# Patient Record
Sex: FEMALE | Race: WHITE
[De-identification: names, ages, dates, MRNs, and addresses within clinical notes are randomized per-mention and may not be internally consistent; named-entity substitution may affect disease eponyms.]

---

## 2020-10-06 ENCOUNTER — HOSPITAL ENCOUNTER (INPATIENT)
Dept: HOSPITAL 8 - ED | Age: 60
LOS: 6 days | Discharge: HOME | DRG: 471 | End: 2020-10-12
Attending: INTERNAL MEDICINE | Admitting: HOSPITALIST
Payer: MEDICAID

## 2020-10-06 VITALS — DIASTOLIC BLOOD PRESSURE: 89 MMHG | SYSTOLIC BLOOD PRESSURE: 157 MMHG

## 2020-10-06 VITALS — SYSTOLIC BLOOD PRESSURE: 142 MMHG | DIASTOLIC BLOOD PRESSURE: 96 MMHG

## 2020-10-06 VITALS — WEIGHT: 141.98 LBS | HEIGHT: 63.5 IN | BODY MASS INDEX: 24.84 KG/M2

## 2020-10-06 DIAGNOSIS — M48.02: ICD-10-CM

## 2020-10-06 DIAGNOSIS — R26.2: ICD-10-CM

## 2020-10-06 DIAGNOSIS — F10.10: ICD-10-CM

## 2020-10-06 DIAGNOSIS — M47.812: ICD-10-CM

## 2020-10-06 DIAGNOSIS — Z80.1: ICD-10-CM

## 2020-10-06 DIAGNOSIS — M43.12: Primary | ICD-10-CM

## 2020-10-06 DIAGNOSIS — F17.210: ICD-10-CM

## 2020-10-06 DIAGNOSIS — Z20.828: ICD-10-CM

## 2020-10-06 DIAGNOSIS — R26.89: ICD-10-CM

## 2020-10-06 DIAGNOSIS — G95.19: ICD-10-CM

## 2020-10-06 DIAGNOSIS — I10: ICD-10-CM

## 2020-10-06 DIAGNOSIS — M47.12: ICD-10-CM

## 2020-10-06 DIAGNOSIS — Z90.49: ICD-10-CM

## 2020-10-06 LAB
ALBUMIN SERPL-MCNC: 3.8 G/DL (ref 3.4–5)
ALP SERPL-CCNC: 114 U/L (ref 45–117)
ALT SERPL-CCNC: 20 U/L (ref 12–78)
ANION GAP SERPL CALC-SCNC: 3 MMOL/L (ref 5–15)
APTT BLD: 24 SECONDS (ref 25–31)
BASOPHILS # BLD AUTO: 0.1 X10^3/UL (ref 0–0.1)
BASOPHILS NFR BLD AUTO: 2 % (ref 0–1)
BILIRUB SERPL-MCNC: 0.4 MG/DL (ref 0.2–1)
CALCIUM SERPL-MCNC: 8.7 MG/DL (ref 8.5–10.1)
CHLORIDE SERPL-SCNC: 114 MMOL/L (ref 98–107)
CREAT SERPL-MCNC: 0.63 MG/DL (ref 0.55–1.02)
EOSINOPHIL # BLD AUTO: 0.1 X10^3/UL (ref 0–0.4)
EOSINOPHIL NFR BLD AUTO: 2 % (ref 1–7)
ERYTHROCYTE [DISTWIDTH] IN BLOOD BY AUTOMATED COUNT: 12.8 % (ref 9.6–15.2)
INR PPP: 1 (ref 0.93–1.1)
LYMPHOCYTES # BLD AUTO: 1.9 X10^3/UL (ref 1–3.4)
LYMPHOCYTES NFR BLD AUTO: 34 % (ref 22–44)
MCH RBC QN AUTO: 32.8 PG (ref 27–34.8)
MCHC RBC AUTO-ENTMCNC: 33.4 G/DL (ref 32.4–35.8)
MD: NO
MICROSCOPIC: (no result)
MONOCYTES # BLD AUTO: 0.5 X10^3/UL (ref 0.2–0.8)
MONOCYTES NFR BLD AUTO: 8 % (ref 2–9)
NEUTROPHILS # BLD AUTO: 3.1 X10^3/UL (ref 1.8–6.8)
NEUTROPHILS NFR BLD AUTO: 54 % (ref 42–75)
PLATELET # BLD AUTO: 273 X10^3/UL (ref 130–400)
PMV BLD AUTO: 8.5 FL (ref 7.4–10.4)
PROT SERPL-MCNC: 7.2 G/DL (ref 6.4–8.2)
PROTHROMBIN TIME: 10.3 SECONDS (ref 9.6–11.5)
RBC # BLD AUTO: 4.31 X10^6/UL (ref 3.82–5.3)
TROPONIN I SERPL-MCNC: < 0.015 NG/ML (ref 0–0.04)

## 2020-10-06 PROCEDURE — 87635 SARS-COV-2 COVID-19 AMP PRB: CPT

## 2020-10-06 PROCEDURE — 81001 URINALYSIS AUTO W/SCOPE: CPT

## 2020-10-06 PROCEDURE — 85610 PROTHROMBIN TIME: CPT

## 2020-10-06 PROCEDURE — 72157 MRI CHEST SPINE W/O & W/DYE: CPT

## 2020-10-06 PROCEDURE — 85025 COMPLETE CBC W/AUTO DIFF WBC: CPT

## 2020-10-06 PROCEDURE — 90686 IIV4 VACC NO PRSV 0.5 ML IM: CPT

## 2020-10-06 PROCEDURE — C1713 ANCHOR/SCREW BN/BN,TIS/BN: HCPCS

## 2020-10-06 PROCEDURE — 71045 X-RAY EXAM CHEST 1 VIEW: CPT

## 2020-10-06 PROCEDURE — 99285 EMERGENCY DEPT VISIT HI MDM: CPT

## 2020-10-06 PROCEDURE — C1729 CATH, DRAINAGE: HCPCS

## 2020-10-06 PROCEDURE — 72158 MRI LUMBAR SPINE W/O & W/DYE: CPT

## 2020-10-06 PROCEDURE — 95938 SOMATOSENSORY TESTING: CPT

## 2020-10-06 PROCEDURE — C1763 CONN TISS, NON-HUMAN: HCPCS

## 2020-10-06 PROCEDURE — 95941 IONM REMOTE/>1 PT OR PER HR: CPT

## 2020-10-06 PROCEDURE — A9575 INJ GADOTERATE MEGLUMI 0.1ML: HCPCS

## 2020-10-06 PROCEDURE — 84484 ASSAY OF TROPONIN QUANT: CPT

## 2020-10-06 PROCEDURE — 83880 ASSAY OF NATRIURETIC PEPTIDE: CPT

## 2020-10-06 PROCEDURE — 76000 FLUOROSCOPY <1 HR PHYS/QHP: CPT

## 2020-10-06 PROCEDURE — 36415 COLL VENOUS BLD VENIPUNCTURE: CPT

## 2020-10-06 PROCEDURE — 70450 CT HEAD/BRAIN W/O DYE: CPT

## 2020-10-06 PROCEDURE — 96374 THER/PROPH/DIAG INJ IV PUSH: CPT

## 2020-10-06 PROCEDURE — 80053 COMPREHEN METABOLIC PANEL: CPT

## 2020-10-06 PROCEDURE — 72040 X-RAY EXAM NECK SPINE 2-3 VW: CPT

## 2020-10-06 PROCEDURE — 93005 ELECTROCARDIOGRAM TRACING: CPT

## 2020-10-06 PROCEDURE — 72156 MRI NECK SPINE W/O & W/DYE: CPT

## 2020-10-06 PROCEDURE — 85730 THROMBOPLASTIN TIME PARTIAL: CPT

## 2020-10-06 RX ADMIN — GABAPENTIN SCH MG: 300 CAPSULE ORAL at 19:36

## 2020-10-06 RX ADMIN — THIAMINE HYDROCHLORIDE SCH MLS/HR: 100 INJECTION, SOLUTION INTRAMUSCULAR; INTRAVENOUS at 14:35

## 2020-10-06 RX ADMIN — GABAPENTIN SCH MG: 300 CAPSULE ORAL at 15:57

## 2020-10-06 RX ADMIN — FAMOTIDINE SCH MG: 10 TABLET ORAL at 21:00

## 2020-10-06 RX ADMIN — SODIUM CHLORIDE, PRESERVATIVE FREE SCH ML: 5 INJECTION INTRAVENOUS at 21:00

## 2020-10-06 NOTE — NUR
ASSUMED CARE OF PATIENT IN ROOM 4.  PT C/O NUMBNESS AND TINGLING IN HER HANDS 
AND FEET X 1 YEAR AND TROUBLE WALKING DUE TO DIZZINESS/UNSTEADINESS.  PT ALSO 
STATES SHE HAS BEEN HAVING EPISODES OF NAUSEA/VOMITING WHEN SITTING ON THE 
TOILET AND STRAINING TO PUSH OUT A BM.  PT STATES SHE HASN'T HAD A PCP X 30 
YEARS.

## 2020-10-07 VITALS — SYSTOLIC BLOOD PRESSURE: 117 MMHG | DIASTOLIC BLOOD PRESSURE: 78 MMHG

## 2020-10-07 VITALS — SYSTOLIC BLOOD PRESSURE: 146 MMHG | DIASTOLIC BLOOD PRESSURE: 99 MMHG

## 2020-10-07 VITALS — DIASTOLIC BLOOD PRESSURE: 85 MMHG | SYSTOLIC BLOOD PRESSURE: 140 MMHG

## 2020-10-07 VITALS — SYSTOLIC BLOOD PRESSURE: 134 MMHG | DIASTOLIC BLOOD PRESSURE: 89 MMHG

## 2020-10-07 RX ADMIN — GABAPENTIN SCH MG: 400 CAPSULE ORAL at 21:25

## 2020-10-07 RX ADMIN — FAMOTIDINE SCH MG: 10 TABLET ORAL at 09:33

## 2020-10-07 RX ADMIN — GABAPENTIN SCH MG: 400 CAPSULE ORAL at 16:21

## 2020-10-07 RX ADMIN — THIAMINE HYDROCHLORIDE SCH MLS/HR: 100 INJECTION, SOLUTION INTRAMUSCULAR; INTRAVENOUS at 13:17

## 2020-10-07 RX ADMIN — SODIUM CHLORIDE, PRESERVATIVE FREE SCH ML: 5 INJECTION INTRAVENOUS at 09:00

## 2020-10-07 RX ADMIN — NICOTINE SCH PATCH: 21 PATCH, EXTENDED RELEASE TRANSDERMAL at 09:35

## 2020-10-07 RX ADMIN — SODIUM CHLORIDE, PRESERVATIVE FREE SCH ML: 5 INJECTION INTRAVENOUS at 21:25

## 2020-10-07 RX ADMIN — FAMOTIDINE SCH MG: 10 TABLET ORAL at 21:25

## 2020-10-07 RX ADMIN — GABAPENTIN SCH MG: 400 CAPSULE ORAL at 09:33

## 2020-10-08 VITALS — DIASTOLIC BLOOD PRESSURE: 98 MMHG | SYSTOLIC BLOOD PRESSURE: 153 MMHG

## 2020-10-08 VITALS — DIASTOLIC BLOOD PRESSURE: 92 MMHG | SYSTOLIC BLOOD PRESSURE: 151 MMHG

## 2020-10-08 VITALS — DIASTOLIC BLOOD PRESSURE: 93 MMHG | SYSTOLIC BLOOD PRESSURE: 152 MMHG

## 2020-10-08 VITALS — SYSTOLIC BLOOD PRESSURE: 154 MMHG | DIASTOLIC BLOOD PRESSURE: 94 MMHG

## 2020-10-08 RX ADMIN — GABAPENTIN SCH MG: 400 CAPSULE ORAL at 08:50

## 2020-10-08 RX ADMIN — THIAMINE HYDROCHLORIDE SCH MLS/HR: 100 INJECTION, SOLUTION INTRAMUSCULAR; INTRAVENOUS at 13:52

## 2020-10-08 RX ADMIN — LISINOPRIL SCH MG: 10 TABLET ORAL at 21:27

## 2020-10-08 RX ADMIN — NICOTINE SCH PATCH: 21 PATCH, EXTENDED RELEASE TRANSDERMAL at 08:50

## 2020-10-08 RX ADMIN — SODIUM CHLORIDE, PRESERVATIVE FREE SCH ML: 5 INJECTION INTRAVENOUS at 08:52

## 2020-10-08 RX ADMIN — SODIUM CHLORIDE, PRESERVATIVE FREE SCH ML: 5 INJECTION INTRAVENOUS at 21:27

## 2020-10-08 RX ADMIN — FAMOTIDINE SCH MG: 10 TABLET ORAL at 08:50

## 2020-10-08 RX ADMIN — FAMOTIDINE SCH MG: 10 TABLET ORAL at 21:27

## 2020-10-08 RX ADMIN — GABAPENTIN SCH MG: 400 CAPSULE ORAL at 15:54

## 2020-10-08 RX ADMIN — GABAPENTIN SCH MG: 400 CAPSULE ORAL at 21:27

## 2020-10-08 RX ADMIN — LISINOPRIL SCH MG: 10 TABLET ORAL at 08:50

## 2020-10-09 VITALS — SYSTOLIC BLOOD PRESSURE: 135 MMHG | DIASTOLIC BLOOD PRESSURE: 84 MMHG

## 2020-10-09 VITALS — DIASTOLIC BLOOD PRESSURE: 90 MMHG | SYSTOLIC BLOOD PRESSURE: 158 MMHG

## 2020-10-09 VITALS — DIASTOLIC BLOOD PRESSURE: 90 MMHG | SYSTOLIC BLOOD PRESSURE: 186 MMHG

## 2020-10-09 VITALS — DIASTOLIC BLOOD PRESSURE: 83 MMHG | SYSTOLIC BLOOD PRESSURE: 133 MMHG

## 2020-10-09 PROCEDURE — 00NW0ZZ RELEASE CERVICAL SPINAL CORD, OPEN APPROACH: ICD-10-PCS | Performed by: NEUROLOGICAL SURGERY

## 2020-10-09 PROCEDURE — 01N10ZZ RELEASE CERVICAL NERVE, OPEN APPROACH: ICD-10-PCS | Performed by: NEUROLOGICAL SURGERY

## 2020-10-09 PROCEDURE — 0RG2071 FUSION OF 2 OR MORE CERVICAL VERTEBRAL JOINTS WITH AUTOLOGOUS TISSUE SUBSTITUTE, POSTERIOR APPROACH, POSTERIOR COLUMN, OPEN APPROACH: ICD-10-PCS | Performed by: NEUROLOGICAL SURGERY

## 2020-10-09 RX ADMIN — NICOTINE SCH PATCH: 21 PATCH, EXTENDED RELEASE TRANSDERMAL at 08:33

## 2020-10-09 RX ADMIN — GABAPENTIN SCH MG: 400 CAPSULE ORAL at 08:32

## 2020-10-09 RX ADMIN — SODIUM CHLORIDE, PRESERVATIVE FREE SCH ML: 5 INJECTION INTRAVENOUS at 08:00

## 2020-10-09 RX ADMIN — FENTANYL CITRATE PRN MCG: 50 INJECTION INTRAMUSCULAR; INTRAVENOUS at 17:55

## 2020-10-09 RX ADMIN — FAMOTIDINE SCH MG: 10 TABLET ORAL at 09:00

## 2020-10-09 RX ADMIN — LABETALOL HYDROCHLORIDE PRN MG: 5 INJECTION INTRAVENOUS at 17:30

## 2020-10-09 RX ADMIN — SODIUM CHLORIDE, PRESERVATIVE FREE SCH ML: 5 INJECTION INTRAVENOUS at 21:36

## 2020-10-09 RX ADMIN — FENTANYL CITRATE PRN MCG: 50 INJECTION INTRAMUSCULAR; INTRAVENOUS at 17:30

## 2020-10-09 RX ADMIN — LISINOPRIL SCH MG: 10 TABLET ORAL at 07:20

## 2020-10-09 RX ADMIN — THIAMINE HYDROCHLORIDE SCH MLS/HR: 100 INJECTION, SOLUTION INTRAMUSCULAR; INTRAVENOUS at 21:36

## 2020-10-09 RX ADMIN — LISINOPRIL SCH MG: 10 TABLET ORAL at 21:34

## 2020-10-09 RX ADMIN — GABAPENTIN SCH MG: 400 CAPSULE ORAL at 16:00

## 2020-10-09 RX ADMIN — GABAPENTIN SCH MG: 400 CAPSULE ORAL at 21:33

## 2020-10-09 RX ADMIN — SODIUM CHLORIDE AND POTASSIUM CHLORIDE SCH MLS/HR: .9; .15 SOLUTION INTRAVENOUS at 21:36

## 2020-10-09 RX ADMIN — LABETALOL HYDROCHLORIDE PRN MG: 5 INJECTION INTRAVENOUS at 17:50

## 2020-10-10 VITALS — SYSTOLIC BLOOD PRESSURE: 113 MMHG | DIASTOLIC BLOOD PRESSURE: 74 MMHG

## 2020-10-10 VITALS — DIASTOLIC BLOOD PRESSURE: 75 MMHG | SYSTOLIC BLOOD PRESSURE: 116 MMHG

## 2020-10-10 VITALS — DIASTOLIC BLOOD PRESSURE: 71 MMHG | SYSTOLIC BLOOD PRESSURE: 109 MMHG

## 2020-10-10 VITALS — DIASTOLIC BLOOD PRESSURE: 89 MMHG | SYSTOLIC BLOOD PRESSURE: 135 MMHG

## 2020-10-10 VITALS — SYSTOLIC BLOOD PRESSURE: 114 MMHG | DIASTOLIC BLOOD PRESSURE: 77 MMHG

## 2020-10-10 RX ADMIN — METHOCARBAMOL SCH MG: 750 TABLET ORAL at 13:45

## 2020-10-10 RX ADMIN — LISINOPRIL SCH MG: 10 TABLET ORAL at 08:47

## 2020-10-10 RX ADMIN — HYDROCODONE BITARTRATE AND ACETAMINOPHEN PRN TAB: 5; 325 TABLET ORAL at 00:58

## 2020-10-10 RX ADMIN — METHOCARBAMOL SCH MLS/HR: 100 INJECTION INTRAMUSCULAR; INTRAVENOUS at 09:43

## 2020-10-10 RX ADMIN — GABAPENTIN SCH MG: 400 CAPSULE ORAL at 08:47

## 2020-10-10 RX ADMIN — METHOCARBAMOL SCH MG: 750 TABLET ORAL at 20:30

## 2020-10-10 RX ADMIN — METHOCARBAMOL SCH MLS/HR: 100 INJECTION INTRAMUSCULAR; INTRAVENOUS at 17:55

## 2020-10-10 RX ADMIN — HYDROCODONE BITARTRATE AND ACETAMINOPHEN PRN TAB: 5; 325 TABLET ORAL at 10:34

## 2020-10-10 RX ADMIN — HYDROCODONE BITARTRATE AND ACETAMINOPHEN PRN TAB: 5; 325 TABLET ORAL at 14:39

## 2020-10-10 RX ADMIN — GABAPENTIN SCH MG: 400 CAPSULE ORAL at 21:28

## 2020-10-10 RX ADMIN — SODIUM CHLORIDE, PRESERVATIVE FREE SCH ML: 5 INJECTION INTRAVENOUS at 21:28

## 2020-10-10 RX ADMIN — DOCUSATE SODIUM 50MG AND SENNOSIDES 8.6MG SCH TAB: 8.6; 5 TABLET, FILM COATED ORAL at 08:46

## 2020-10-10 RX ADMIN — HYDROCODONE BITARTRATE AND ACETAMINOPHEN PRN TAB: 5; 325 TABLET ORAL at 00:32

## 2020-10-10 RX ADMIN — HYDROCODONE BITARTRATE AND ACETAMINOPHEN PRN TAB: 5; 325 TABLET ORAL at 06:05

## 2020-10-10 RX ADMIN — HYDROCODONE BITARTRATE AND ACETAMINOPHEN PRN TAB: 5; 325 TABLET ORAL at 19:57

## 2020-10-10 RX ADMIN — GABAPENTIN SCH MG: 400 CAPSULE ORAL at 15:34

## 2020-10-10 RX ADMIN — SODIUM CHLORIDE AND POTASSIUM CHLORIDE SCH MLS/HR: .9; .15 SOLUTION INTRAVENOUS at 15:34

## 2020-10-10 RX ADMIN — NICOTINE SCH PATCH: 21 PATCH, EXTENDED RELEASE TRANSDERMAL at 08:46

## 2020-10-10 RX ADMIN — CEFAZOLIN SODIUM SCH MLS/HR: 1 SOLUTION INTRAVENOUS at 01:03

## 2020-10-10 RX ADMIN — FAMOTIDINE SCH MG: 20 TABLET, FILM COATED ORAL at 08:47

## 2020-10-10 RX ADMIN — SODIUM CHLORIDE, PRESERVATIVE FREE SCH ML: 5 INJECTION INTRAVENOUS at 08:50

## 2020-10-10 RX ADMIN — METHOCARBAMOL SCH MLS/HR: 100 INJECTION INTRAMUSCULAR; INTRAVENOUS at 02:09

## 2020-10-10 RX ADMIN — CEFAZOLIN SODIUM SCH MLS/HR: 1 SOLUTION INTRAVENOUS at 09:03

## 2020-10-10 RX ADMIN — SODIUM CHLORIDE AND POTASSIUM CHLORIDE SCH MLS/HR: .9; .15 SOLUTION INTRAVENOUS at 07:00

## 2020-10-10 RX ADMIN — LISINOPRIL SCH MG: 10 TABLET ORAL at 21:28

## 2020-10-10 RX ADMIN — THIAMINE HYDROCHLORIDE SCH MLS/HR: 100 INJECTION, SOLUTION INTRAMUSCULAR; INTRAVENOUS at 21:27

## 2020-10-10 RX ADMIN — FAMOTIDINE SCH MG: 20 TABLET, FILM COATED ORAL at 21:00

## 2020-10-10 RX ADMIN — METHOCARBAMOL SCH MG: 750 TABLET ORAL at 08:30

## 2020-10-11 VITALS — DIASTOLIC BLOOD PRESSURE: 96 MMHG | SYSTOLIC BLOOD PRESSURE: 161 MMHG

## 2020-10-11 VITALS — SYSTOLIC BLOOD PRESSURE: 157 MMHG | DIASTOLIC BLOOD PRESSURE: 95 MMHG

## 2020-10-11 VITALS — DIASTOLIC BLOOD PRESSURE: 81 MMHG | SYSTOLIC BLOOD PRESSURE: 122 MMHG

## 2020-10-11 VITALS — SYSTOLIC BLOOD PRESSURE: 131 MMHG | DIASTOLIC BLOOD PRESSURE: 80 MMHG

## 2020-10-11 RX ADMIN — FAMOTIDINE SCH MG: 20 TABLET, FILM COATED ORAL at 21:00

## 2020-10-11 RX ADMIN — SODIUM CHLORIDE AND POTASSIUM CHLORIDE SCH MLS/HR: .9; .15 SOLUTION INTRAVENOUS at 03:02

## 2020-10-11 RX ADMIN — SODIUM CHLORIDE AND POTASSIUM CHLORIDE SCH MLS/HR: .9; .15 SOLUTION INTRAVENOUS at 13:00

## 2020-10-11 RX ADMIN — SODIUM CHLORIDE, PRESERVATIVE FREE SCH ML: 5 INJECTION INTRAVENOUS at 08:38

## 2020-10-11 RX ADMIN — HYDROCODONE BITARTRATE AND ACETAMINOPHEN PRN TAB: 5; 325 TABLET ORAL at 07:17

## 2020-10-11 RX ADMIN — HYDROCODONE BITARTRATE AND ACETAMINOPHEN PRN TAB: 5; 325 TABLET ORAL at 23:52

## 2020-10-11 RX ADMIN — FAMOTIDINE SCH MG: 20 TABLET, FILM COATED ORAL at 08:35

## 2020-10-11 RX ADMIN — SODIUM CHLORIDE, PRESERVATIVE FREE SCH ML: 5 INJECTION INTRAVENOUS at 21:18

## 2020-10-11 RX ADMIN — THIAMINE HYDROCHLORIDE SCH MLS/HR: 100 INJECTION, SOLUTION INTRAMUSCULAR; INTRAVENOUS at 23:17

## 2020-10-11 RX ADMIN — GABAPENTIN SCH MG: 400 CAPSULE ORAL at 21:17

## 2020-10-11 RX ADMIN — METHOCARBAMOL SCH MG: 750 TABLET ORAL at 13:53

## 2020-10-11 RX ADMIN — METHOCARBAMOL SCH MG: 750 TABLET ORAL at 02:15

## 2020-10-11 RX ADMIN — DOCUSATE SODIUM 50MG AND SENNOSIDES 8.6MG SCH TAB: 8.6; 5 TABLET, FILM COATED ORAL at 08:37

## 2020-10-11 RX ADMIN — SODIUM CHLORIDE AND POTASSIUM CHLORIDE SCH MLS/HR: .9; .15 SOLUTION INTRAVENOUS at 23:17

## 2020-10-11 RX ADMIN — HYDROCODONE BITARTRATE AND ACETAMINOPHEN PRN TAB: 5; 325 TABLET ORAL at 15:15

## 2020-10-11 RX ADMIN — GABAPENTIN SCH MG: 400 CAPSULE ORAL at 08:35

## 2020-10-11 RX ADMIN — LISINOPRIL SCH MG: 10 TABLET ORAL at 21:17

## 2020-10-11 RX ADMIN — NICOTINE SCH PATCH: 21 PATCH, EXTENDED RELEASE TRANSDERMAL at 08:37

## 2020-10-11 RX ADMIN — METHOCARBAMOL SCH MLS/HR: 100 INJECTION INTRAMUSCULAR; INTRAVENOUS at 02:17

## 2020-10-11 RX ADMIN — METHOCARBAMOL SCH MLS/HR: 100 INJECTION INTRAMUSCULAR; INTRAVENOUS at 12:22

## 2020-10-11 RX ADMIN — METHOCARBAMOL SCH MLS/HR: 100 INJECTION INTRAMUSCULAR; INTRAVENOUS at 21:18

## 2020-10-11 RX ADMIN — METHOCARBAMOL SCH MG: 750 TABLET ORAL at 19:30

## 2020-10-11 RX ADMIN — METHOCARBAMOL SCH MG: 750 TABLET ORAL at 07:39

## 2020-10-11 RX ADMIN — LISINOPRIL SCH MG: 10 TABLET ORAL at 08:35

## 2020-10-11 RX ADMIN — GABAPENTIN SCH MG: 400 CAPSULE ORAL at 15:15

## 2020-10-11 RX ADMIN — HYDROCODONE BITARTRATE AND ACETAMINOPHEN PRN TAB: 5; 325 TABLET ORAL at 02:16

## 2020-10-11 RX ADMIN — HYDROCODONE BITARTRATE AND ACETAMINOPHEN PRN TAB: 5; 325 TABLET ORAL at 11:37

## 2020-10-11 RX ADMIN — HYDROCODONE BITARTRATE AND ACETAMINOPHEN PRN TAB: 5; 325 TABLET ORAL at 19:30

## 2020-10-12 VITALS — SYSTOLIC BLOOD PRESSURE: 133 MMHG | DIASTOLIC BLOOD PRESSURE: 87 MMHG

## 2020-10-12 VITALS — SYSTOLIC BLOOD PRESSURE: 165 MMHG | DIASTOLIC BLOOD PRESSURE: 94 MMHG

## 2020-10-12 VITALS — SYSTOLIC BLOOD PRESSURE: 163 MMHG | DIASTOLIC BLOOD PRESSURE: 91 MMHG

## 2020-10-12 VITALS — SYSTOLIC BLOOD PRESSURE: 137 MMHG | DIASTOLIC BLOOD PRESSURE: 92 MMHG

## 2020-10-12 RX ADMIN — METHOCARBAMOL SCH MG: 750 TABLET ORAL at 08:19

## 2020-10-12 RX ADMIN — GABAPENTIN SCH MG: 400 CAPSULE ORAL at 08:19

## 2020-10-12 RX ADMIN — DOCUSATE SODIUM 50MG AND SENNOSIDES 8.6MG SCH TAB: 8.6; 5 TABLET, FILM COATED ORAL at 08:18

## 2020-10-12 RX ADMIN — HYDROCODONE BITARTRATE AND ACETAMINOPHEN PRN TAB: 5; 325 TABLET ORAL at 04:21

## 2020-10-12 RX ADMIN — FAMOTIDINE SCH MG: 20 TABLET, FILM COATED ORAL at 08:19

## 2020-10-12 RX ADMIN — GABAPENTIN SCH MG: 400 CAPSULE ORAL at 14:29

## 2020-10-12 RX ADMIN — LISINOPRIL SCH MG: 10 TABLET ORAL at 08:19

## 2020-10-12 RX ADMIN — HYDROCODONE BITARTRATE AND ACETAMINOPHEN PRN TAB: 5; 325 TABLET ORAL at 08:18

## 2020-10-12 RX ADMIN — HYDROCODONE BITARTRATE AND ACETAMINOPHEN PRN TAB: 5; 325 TABLET ORAL at 12:38

## 2020-10-12 RX ADMIN — SODIUM CHLORIDE AND POTASSIUM CHLORIDE SCH MLS/HR: .9; .15 SOLUTION INTRAVENOUS at 08:21

## 2020-10-12 RX ADMIN — METHOCARBAMOL SCH MG: 750 TABLET ORAL at 14:30

## 2020-10-12 RX ADMIN — SODIUM CHLORIDE, PRESERVATIVE FREE SCH ML: 5 INJECTION INTRAVENOUS at 08:20

## 2020-10-12 RX ADMIN — METHOCARBAMOL SCH MG: 750 TABLET ORAL at 03:09

## 2020-10-12 RX ADMIN — NICOTINE SCH PATCH: 21 PATCH, EXTENDED RELEASE TRANSDERMAL at 08:20

## 2021-08-07 ENCOUNTER — HOSPITAL ENCOUNTER (OUTPATIENT)
Dept: RADIOLOGY | Facility: MEDICAL CENTER | Age: 61
End: 2021-08-07
Attending: NURSE PRACTITIONER
Payer: MEDICAID

## 2021-08-07 DIAGNOSIS — M48.02 SPINAL STENOSIS IN CERVICAL REGION: ICD-10-CM

## 2021-08-07 PROCEDURE — 72141 MRI NECK SPINE W/O DYE: CPT

## 2021-08-07 PROCEDURE — 72050 X-RAY EXAM NECK SPINE 4/5VWS: CPT

## 2021-08-23 ENCOUNTER — HOSPITAL ENCOUNTER (OUTPATIENT)
Facility: MEDICAL CENTER | Age: 61
End: 2021-08-23
Attending: NEUROLOGICAL SURGERY | Admitting: NEUROLOGICAL SURGERY
Payer: MEDICAID